# Patient Record
Sex: FEMALE | Race: BLACK OR AFRICAN AMERICAN | URBAN - METROPOLITAN AREA
[De-identification: names, ages, dates, MRNs, and addresses within clinical notes are randomized per-mention and may not be internally consistent; named-entity substitution may affect disease eponyms.]

---

## 2017-03-28 ASSESSMENT — MIFFLIN-ST. JEOR: SCORE: 1177.11

## 2017-03-29 ENCOUNTER — SURGERY - HEALTHEAST (OUTPATIENT)
Dept: GASTROENTEROLOGY | Facility: HOSPITAL | Age: 77
End: 2017-03-29

## 2019-02-28 ENCOUNTER — AMBULATORY - HEALTHEAST (OUTPATIENT)
Dept: CARE COORDINATION | Facility: CLINIC | Age: 79
End: 2019-02-28

## 2021-07-15 VITALS — WEIGHT: 164.9 LBS | BODY MASS INDEX: 29.22 KG/M2 | HEIGHT: 63 IN

## 2021-07-15 NOTE — PROGRESS NOTES
Patient currently receiving primary care through [facility]. She does not meet criteria for enrollment in [facility]. Will not send referral.

## 2021-07-16 ENCOUNTER — RECORDS - HEALTHEAST (OUTPATIENT)
Dept: ADMINISTRATIVE | Facility: CLINIC | Age: 81
End: 2021-07-16

## 2021-07-20 PROBLEM — K20.90 ESOPHAGITIS: Status: ACTIVE | Noted: 2019-02-21

## 2021-07-20 PROBLEM — R53.1 GENERAL WEAKNESS: Status: ACTIVE | Noted: 2019-02-07

## 2021-07-20 PROBLEM — I10 HTN (HYPERTENSION): Status: ACTIVE | Noted: 2017-03-30

## 2021-07-20 PROBLEM — M25.561 CHRONIC PAIN OF RIGHT KNEE: Status: ACTIVE | Noted: 2019-04-12

## 2021-07-20 PROBLEM — N17.9 AKI (ACUTE KIDNEY INJURY) (H): Status: ACTIVE | Noted: 2019-02-21

## 2021-07-20 PROBLEM — R11.10 VOMITING: Status: ACTIVE | Noted: 2017-03-30

## 2021-07-20 PROBLEM — G57.93 NEUROPATHIC PAIN OF BOTH FEET: Status: ACTIVE | Noted: 2017-03-30

## 2021-07-20 PROBLEM — R10.13 EPIGASTRIC PAIN: Status: ACTIVE | Noted: 2017-03-28

## 2021-07-20 PROBLEM — R10.13 EPIGASTRIC ABDOMINAL PAIN: Status: ACTIVE | Noted: 2017-03-28

## 2021-07-20 PROBLEM — R53.1 WEAKNESS: Status: ACTIVE | Noted: 2019-02-07

## 2021-07-20 PROBLEM — D72.829 LEUKOCYTOSIS, UNSPECIFIED TYPE: Status: ACTIVE | Noted: 2019-02-21

## 2021-07-20 PROBLEM — G89.29 CHRONIC PAIN OF RIGHT KNEE: Status: ACTIVE | Noted: 2019-04-12

## 2021-07-20 PROBLEM — E11.9 DM2 (DIABETES MELLITUS, TYPE 2) (H): Status: ACTIVE | Noted: 2017-03-30

## 2021-07-20 PROBLEM — R29.898 FATIGUE OF LOWER EXTREMITY: Status: ACTIVE | Noted: 2019-02-07

## 2021-07-20 PROBLEM — D50.9 IRON DEFICIENCY ANEMIA, UNSPECIFIED IRON DEFICIENCY ANEMIA TYPE: Status: ACTIVE | Noted: 2018-11-26

## 2021-07-20 PROBLEM — J18.9 HCAP (HEALTHCARE-ASSOCIATED PNEUMONIA): Status: ACTIVE | Noted: 2019-02-24

## 2021-07-20 PROBLEM — E44.0 MODERATE PROTEIN MALNUTRITION (H): Status: ACTIVE | Noted: 2019-02-22

## 2021-07-20 PROBLEM — R65.10 SIRS (SYSTEMIC INFLAMMATORY RESPONSE SYNDROME) (H): Status: ACTIVE | Noted: 2019-02-24

## 2021-07-20 PROBLEM — R11.0 NAUSEA: Status: ACTIVE | Noted: 2017-03-30

## 2021-07-20 PROBLEM — R10.10 UPPER ABDOMINAL PAIN: Status: ACTIVE | Noted: 2017-03-20
